# Patient Record
Sex: MALE | URBAN - METROPOLITAN AREA
[De-identification: names, ages, dates, MRNs, and addresses within clinical notes are randomized per-mention and may not be internally consistent; named-entity substitution may affect disease eponyms.]

---

## 2020-03-13 ENCOUNTER — HOSPITAL ENCOUNTER (OUTPATIENT)
Facility: CLINIC | Age: 70
End: 2020-03-13
Attending: UROLOGY | Admitting: UROLOGY

## 2020-03-13 RX ORDER — CEFAZOLIN SODIUM 2 G/100ML
2 INJECTION, SOLUTION INTRAVENOUS
Status: CANCELLED | OUTPATIENT
Start: 2020-03-16

## 2020-03-13 NOTE — OR NURSING
Attempted to call pt x3, no answer.  H&P states recommendation to have echo prior to surgery, unable to get a hold of patient after 3 attempts.  Office notified.

## 2020-03-13 NOTE — OR NURSING
Spoke with triage nurse who transferred me to . Left message with  at Dr Smith's office that pt need an echocardiogram prior to surgery and this nurse was unable to get a hold of the patient after 3 attempts.